# Patient Record
Sex: FEMALE | URBAN - NONMETROPOLITAN AREA
[De-identification: names, ages, dates, MRNs, and addresses within clinical notes are randomized per-mention and may not be internally consistent; named-entity substitution may affect disease eponyms.]

---

## 2024-09-20 ENCOUNTER — HOSPITAL ENCOUNTER (OUTPATIENT)
Dept: CT IMAGING | Age: 29
Discharge: HOME | End: 2024-09-20
Attending: RADIOLOGY

## 2024-09-20 DIAGNOSIS — Z00.6 EXAMINATION FOR NORMAL COMPARISON FOR CLINICAL RESEARCH: ICD-10-CM

## 2024-09-25 ENCOUNTER — OFFICE VISIT (OUTPATIENT)
Dept: NEUROLOGY | Age: 29
End: 2024-09-25
Payer: COMMERCIAL

## 2024-09-25 VITALS
OXYGEN SATURATION: 97 % | BODY MASS INDEX: 49.87 KG/M2 | HEIGHT: 62 IN | HEART RATE: 92 BPM | DIASTOLIC BLOOD PRESSURE: 68 MMHG | WEIGHT: 271 LBS | SYSTOLIC BLOOD PRESSURE: 130 MMHG

## 2024-09-25 DIAGNOSIS — H47.10 PAPILLEDEMA OF BOTH EYES: ICD-10-CM

## 2024-09-25 DIAGNOSIS — G44.89 OTHER HEADACHE SYNDROME: Primary | ICD-10-CM

## 2024-09-25 PROCEDURE — 99205 OFFICE O/P NEW HI 60 MIN: CPT | Performed by: PSYCHIATRY & NEUROLOGY

## 2024-09-25 RX ORDER — LEVOTHYROXINE SODIUM 75 UG/1
75 TABLET ORAL DAILY
COMMUNITY
Start: 2024-09-15

## 2024-09-25 RX ORDER — FLUOXETINE 20 MG/1
20 TABLET, FILM COATED ORAL DAILY
COMMUNITY
Start: 2024-09-15

## 2024-09-25 NOTE — PATIENT INSTRUCTIONS
MRI brain W/WO contrast  Obtain records from optometry, Dr. Sidhu for review  Referral to opthalmology re: dilated eye exam, visual field mapping  Lumbar puncture to be done for diagnostic purposes, please measure opening pressure  CSF to be sent for various cultures and studies   Vitamin A level  Vitamin E level  You need to work on weight loss efforts as discussed  Call with any new symptoms or concerns.   Follow up 1 month

## 2024-10-04 NOTE — PROGRESS NOTES
Chief Complaint   Patient presents with    Consultation/Headache     Poonam Yusuf is a 29 y.o. female who presents today for evaluation of headache since teenage years that have gotten worse in the past 4-5 months. Location of her pain is behind her eyes bilaterally and radiate to her occipital area. She denies any change in vision. She was seen by her eye doctor and was told  she had bilateral optic nerve swelling, and noticed she also had a blind spot in her visual field. She had CT head WO contrast done 9/12/24 at [facility] that showed no concerning findings.  She is not currently on any oral birth control. She has gained 30-40 pounds in the past 1 year unintentionally. No antibiotic use. No family history of similar symptoms. She   denies chest pain. No shortness of breath, no neck pain. No vision changes. No dysphagia. No fever. No rash. No weight loss.  History provided by patient accompanied by her family.       Past Medical History:   Diagnosis Date    Depression     Hashimoto's thyroiditis     Headache     Hyperthyroidism        There is no problem list on file for this patient.      Allergies   Allergen Reactions    Ceclor [Cefaclor]     Imitrex [Sumatriptan]     Seasonal        Current Outpatient Medications   Medication Sig Dispense Refill    FLUoxetine (PROZAC) 20 MG tablet Take 1 tablet by mouth daily      levothyroxine (SYNTHROID) 75 MCG tablet Take 1 tablet by mouth Daily       No current facility-administered medications for this visit.       Social History     Socioeconomic History    Marital status:      Spouse name: None    Number of children: None    Years of education: None    Highest education level: None   Tobacco Use    Smoking status: Former     Types: Cigarettes    Smokeless tobacco: Never   Vaping Use    Vaping status: Every Day    Substances: Nicotine   Substance and Sexual Activity    Alcohol use: Yes     Comment: social    Drug use: Never    Sexual activity: Yes     Partners: Male

## 2024-10-11 ENCOUNTER — HOSPITAL ENCOUNTER (OUTPATIENT)
Dept: MRI IMAGING | Age: 29
Discharge: HOME | End: 2024-10-11
Payer: COMMERCIAL

## 2024-10-11 DIAGNOSIS — G44.89 OTHER HEADACHE SYNDROME: ICD-10-CM

## 2024-10-11 DIAGNOSIS — H47.10 PAPILLEDEMA OF BOTH EYES: ICD-10-CM

## 2024-10-11 PROCEDURE — 6360000004 HC RX CONTRAST MEDICATION: Performed by: NURSE PRACTITIONER

## 2024-10-11 PROCEDURE — 70553 MRI BRAIN STEM W/O & W/DYE: CPT

## 2024-10-11 PROCEDURE — A9579 GAD-BASE MR CONTRAST NOS,1ML: HCPCS | Performed by: NURSE PRACTITIONER

## 2024-10-11 RX ADMIN — GADOTERIDOL 20 ML: 279.3 INJECTION, SOLUTION INTRAVENOUS at 13:01

## 2024-10-15 ENCOUNTER — TELEPHONE (OUTPATIENT)
Dept: NEUROLOGY | Age: 29
End: 2024-10-15

## 2024-10-15 DIAGNOSIS — G44.89 OTHER HEADACHE SYNDROME: ICD-10-CM

## 2024-10-15 DIAGNOSIS — H47.10 PAPILLEDEMA OF BOTH EYES: Primary | ICD-10-CM

## 2024-10-15 NOTE — TELEPHONE ENCOUNTER
Per Alejandra, patient needs to complete lumbar puncture prior to follow up appointment.  LM for patient to return call to see if she had scheduled LP and to reschedule in office appointment.

## 2024-10-17 ENCOUNTER — PATIENT MESSAGE (OUTPATIENT)
Dept: NEUROLOGY | Age: 29
End: 2024-10-17

## 2024-10-17 NOTE — TELEPHONE ENCOUNTER
Lumbar Puncture scheduled at [facility] 10/24/2024 at 9:30. Patient is to arrive at 8 am. NPO 2 hours. Hold aspirin / blood thinners x 5 days prior. Needs .     Rescheduled follow up appointment with Paige Leopold for 11/08/2024 at 8 am.     Patient advised of the above information. Verbalized understanding with no questions at this time.

## 2024-10-24 ENCOUNTER — HOSPITAL ENCOUNTER (OUTPATIENT)
Dept: INTERVENTIONAL RADIOLOGY/VASCULAR | Age: 29
Discharge: HOME | End: 2024-10-24
Payer: COMMERCIAL

## 2024-10-24 VITALS
TEMPERATURE: 97.5 F | OXYGEN SATURATION: 97 % | SYSTOLIC BLOOD PRESSURE: 118 MMHG | BODY MASS INDEX: 50.66 KG/M2 | DIASTOLIC BLOOD PRESSURE: 72 MMHG | HEART RATE: 87 BPM | WEIGHT: 277 LBS | RESPIRATION RATE: 18 BRPM

## 2024-10-24 DIAGNOSIS — H47.10 PAPILLEDEMA OF BOTH EYES: ICD-10-CM

## 2024-10-24 DIAGNOSIS — G44.89 OTHER HEADACHE SYNDROME: ICD-10-CM

## 2024-10-24 LAB
CHARACTER, CSF: ABNORMAL
CMV DNA CSF QL NAA+PROBE: NOT DETECTED
COLOR CSF: ABNORMAL
CRYPTOC AG CSF QL: NOT DETECTED
E COLI K1 AG CSF QL: NOT DETECTED
EV RNA CSF QL NAA+PROBE: NOT DETECTED
GLUCOSE CSF-MCNC: 62 MG/DL (ref 40–80)
GP B STREP DNA SPEC QL NAA+PROBE: NOT DETECTED
HAEM INFLU DNA SPEC QL NAA+PROBE: NOT DETECTED
HHV6 DNA CSF QL NAA+PROBE: NOT DETECTED
HSV1 DNA CSF QL NAA+PROBE: NOT DETECTED
HSV2 DNA CSF QL NAA+PROBE: NOT DETECTED
L MONOCYTOG DNA SPEC QL NAA+PROBE: NOT DETECTED
LYMPHOCYTES NFR CSF MANUAL: 32 % (ref 0–90)
MONOS+MACROS NFR CSF MANUAL: 2 % (ref 0–45)
N MEN DNA SPEC QL NAA+PROBE: NOT DETECTED
NEUTS SEG NFR CSF MANUAL: 66 % (ref 0–6)
NUC CELL # FLD AUTO: 24 /CUMM (ref 0–5)
PARECHOVIRUS A RNA SPEC QL NAA+PROBE: NOT DETECTED
PATHOLOGIST REVIEW: ABNORMAL
PROT CSF-MCNC: 96 MG/DL (ref 12–60)
RBC # CSF MANUAL: ABNORMAL /CUMM
S PNEUM DNA SPEC QL NAA+PROBE: NOT DETECTED
TUBE VOLUME RECEIVED CSF: 3 ML
VZV DNA CSF QL NAA+PROBE: NOT DETECTED

## 2024-10-24 PROCEDURE — 2580000003 HC RX 258: Performed by: RADIOLOGY

## 2024-10-24 PROCEDURE — 89051 BODY FLUID CELL COUNT: CPT

## 2024-10-24 PROCEDURE — 86592 SYPHILIS TEST NON-TREP QUAL: CPT

## 2024-10-24 PROCEDURE — 86618 LYME DISEASE ANTIBODY: CPT

## 2024-10-24 PROCEDURE — 82164 ANGIOTENSIN I ENZYME TEST: CPT

## 2024-10-24 PROCEDURE — 62328 DX LMBR SPI PNXR W/FLUOR/CT: CPT

## 2024-10-24 PROCEDURE — 83916 OLIGOCLONAL BANDS: CPT

## 2024-10-24 PROCEDURE — 82042 OTHER SOURCE ALBUMIN QUAN EA: CPT

## 2024-10-24 PROCEDURE — 82040 ASSAY OF SERUM ALBUMIN: CPT

## 2024-10-24 PROCEDURE — 87798 DETECT AGENT NOS DNA AMP: CPT

## 2024-10-24 PROCEDURE — 82784 ASSAY IGA/IGD/IGG/IGM EACH: CPT

## 2024-10-24 PROCEDURE — 82945 GLUCOSE OTHER FLUID: CPT

## 2024-10-24 PROCEDURE — 84157 ASSAY OF PROTEIN OTHER: CPT

## 2024-10-24 RX ORDER — SODIUM CHLORIDE 450 MG/100ML
INJECTION, SOLUTION INTRAVENOUS CONTINUOUS
Status: DISCONTINUED | OUTPATIENT
Start: 2024-10-24 | End: 2024-10-25 | Stop reason: HOSPADM

## 2024-10-24 RX ADMIN — SODIUM CHLORIDE: 4.5 INJECTION, SOLUTION INTRAVENOUS at 08:23

## 2024-10-24 ASSESSMENT — PAIN SCALES - GENERAL
PAINLEVEL_OUTOF10: 4
PAINLEVEL_OUTOF10: 3
PAINLEVEL_OUTOF10: 6

## 2024-10-24 ASSESSMENT — PAIN DESCRIPTION - LOCATION
LOCATION: HEAD

## 2024-10-24 ASSESSMENT — PAIN - FUNCTIONAL ASSESSMENT: PAIN_FUNCTIONAL_ASSESSMENT: 0-10

## 2024-10-24 NOTE — PROGRESS NOTES
0851 Pt in specials radiology for lumbar puncture. Discussed procedure with pt and pt verbalizes understanding.  0900 Dr Cordero to start procedure.  0908 Opening pressure-13.  0913 Total of 11.5 ml's blood tinged fluid withdrawn. Pt tolerated well.  0915 Pt positioned on cart for comfort. Offers no complaints.  0929 Pt transferred to [facility] [place] per cart.

## 2024-10-24 NOTE — H&P
Formulation and discussion of sedation / procedure plans, risks, benefits, side effects and alternatives with patient and/or responsible adult completed.    Electronically signed by Sathish Cordero MD on 10/24/2024 at 8:57 AM

## 2024-10-24 NOTE — DISCHARGE INSTRUCTIONS
LUMBAR PUNCTURE DISCHARGE CARE    1.  Drink extra liquids today.  2.  Minimal activity for the remainder of the day.  Go home and rest.  No driving today.  3.  You may get a headache after the Lumbar Puncture.  Lying down usually lessens the headache.  You may take Tylenol for the headache.  4.  Keep puncture site lower back clean and dry for 24 hours.  Cover area with a band- aid for 1 - 2 days.    Call your doctor if:  You have a headache lasting longer than 24 hours.  Bleeding other than a small spot on the band-aid from puncture site.    Seek care immediately by going to the nearest Emergency Room if:  You have shaking, chills or temperature over 101 deg F.  You have severe headache.  You have numbness, weakness,or tingling in your legs or change in mental alertness.    Any questions call your physician or [facility] [phone] or [phone]

## 2024-10-24 NOTE — PROGRESS NOTES
Patient admitted to room 2, vitals are stable. Patient offered rights and responsibilities.     _m__ Safety:       (Environmental)  [place] to environment  Ensure ID band is correct and in place/ allergy band as needed  Assess for fall risk  Initiate fall precautions as applicable (fall band, side rails, etc.)  Call light within reach  Bed in low position/ wheels locked    __m__ Pain:       Assess pain level and characteristics  Administer analgesics as ordered  Assess effectiveness of pain management and report to MD as needed    __m__ Knowledge Deficit:  Assess baseline knowledge  Provide teaching at level of understanding  Provide teaching via preferred learning method  Evaluate teaching effectiveness    __m__ Hemodynamic/Respiratory Status:       (Pre and Post Procedure Monitoring)  Assess/Monitor vital signs and LOC  Assess Baseline SpO2 prior to any sedation  Obtain weight/height  Assess vital signs/ LOC until patient meets discharge criteria  Monitor procedure site and notify MD of any issues

## 2024-10-24 NOTE — OP NOTE
Department of Radiology  Post Procedure Progress Note      Pre-Procedure Diagnosis:  Papilledema    Procedure Performed:  Lumbar puncture with opening pressures    Anesthesia: local     Findings: successful.     Immediate Complications:  None    Estimated Blood Loss: minimal    SEE DICTATED PROCEDURE NOTE FOR COMPLETE DETAILS.    Electronically signed by Sathish Cordero MD on 10/24/2024 at 9:17 AM

## 2024-10-25 LAB — VDRL CSF QL: NONREACTIVE

## 2024-10-26 LAB
ACE CSF-CCNC: NORMAL U/L
OLIGOCLONAL BANDS CSF: NORMAL

## 2024-10-28 DIAGNOSIS — H47.10 PAPILLEDEMA OF BOTH EYES: Primary | ICD-10-CM

## 2024-10-28 DIAGNOSIS — G44.89 OTHER HEADACHE SYNDROME: ICD-10-CM

## 2024-10-28 LAB — EBV DNA SPEC QL NAA+PROBE: NORMAL

## 2024-10-29 ENCOUNTER — TELEPHONE (OUTPATIENT)
Dept: NEUROLOGY | Age: 29
End: 2024-10-29

## 2024-10-29 NOTE — TELEPHONE ENCOUNTER
----- Message from Paige Leopold, APRN - CNP sent at 10/28/2024  2:54 PM EDT -----  Please let patient know her opening pressure from her LP=13. This does not qualify her for dx of pseudotumor cerebri.   Her total nucleated cells was elevated, as well as her protein was mildly elevated=96. This is to be expected for traumatic tap.  Would recommend formal evaluation with neuro-opthalmology at [facility].  Paige Leopold, CNP

## 2024-10-30 NOTE — TELEPHONE ENCOUNTER
Patient agreeable to neuro-ophthalmology at [facility]. Patient wanted to check and see if it would be ok to get a root canal or if she should wait due to the circumstance. Also she is has vacation planned to [place] and is just wanting to make sure that the trip is ok.

## 2024-11-05 LAB — B. BURGDORFERI VLSE1/PEPC10 ABS, CSF: NORMAL

## 2025-06-23 ENCOUNTER — HOSPITAL ENCOUNTER (OUTPATIENT)
Dept: CT IMAGING | Age: 30
Discharge: HOME | End: 2025-06-23
Attending: RADIOLOGY

## 2025-06-23 DIAGNOSIS — Z00.6 ENCOUNTER FOR EXAMINATION FOR NORMAL COMPARISON OR CONTROL IN CLINICAL RESEARCH PROGRAM: ICD-10-CM

## 2025-06-24 ENCOUNTER — HOSPITAL ENCOUNTER (OUTPATIENT)
Dept: ULTRASOUND IMAGING | Age: 30
Discharge: HOME | End: 2025-06-24
Attending: RADIOLOGY

## 2025-06-24 ENCOUNTER — HOSPITAL ENCOUNTER (OUTPATIENT)
Age: 30
Setting detail: OUTPATIENT SURGERY
Discharge: HOME | End: 2025-06-24
Attending: UROLOGY | Admitting: UROLOGY
Payer: COMMERCIAL

## 2025-06-24 ENCOUNTER — ANESTHESIA (OUTPATIENT)
Dept: OPERATING ROOM | Age: 30
End: 2025-06-24
Payer: COMMERCIAL

## 2025-06-24 ENCOUNTER — ANESTHESIA EVENT (OUTPATIENT)
Dept: OPERATING ROOM | Age: 30
End: 2025-06-24
Payer: COMMERCIAL

## 2025-06-24 VITALS
BODY MASS INDEX: 46.01 KG/M2 | HEART RATE: 84 BPM | WEIGHT: 250 LBS | RESPIRATION RATE: 16 BRPM | DIASTOLIC BLOOD PRESSURE: 86 MMHG | OXYGEN SATURATION: 98 % | HEIGHT: 62 IN | SYSTOLIC BLOOD PRESSURE: 120 MMHG | TEMPERATURE: 96.9 F

## 2025-06-24 DIAGNOSIS — Z00.6 ENCOUNTER FOR EXAMINATION FOR NORMAL COMPARISON OR CONTROL IN CLINICAL RESEARCH PROGRAM: ICD-10-CM

## 2025-06-24 DIAGNOSIS — N20.0 KIDNEY STONE: Primary | ICD-10-CM

## 2025-06-24 LAB — PREGNANCY, URINE: NEGATIVE

## 2025-06-24 PROCEDURE — 3600000003 HC SURGERY LEVEL 3 BASE: Performed by: UROLOGY

## 2025-06-24 PROCEDURE — 81025 URINE PREGNANCY TEST: CPT

## 2025-06-24 PROCEDURE — 2500000003 HC RX 250 WO HCPCS: Performed by: NURSE ANESTHETIST, CERTIFIED REGISTERED

## 2025-06-24 PROCEDURE — 3700000000 HC ANESTHESIA ATTENDED CARE: Performed by: UROLOGY

## 2025-06-24 PROCEDURE — C2617 STENT, NON-COR, TEM W/O DEL: HCPCS | Performed by: UROLOGY

## 2025-06-24 PROCEDURE — 7100000001 HC PACU RECOVERY - ADDTL 15 MIN: Performed by: UROLOGY

## 2025-06-24 PROCEDURE — 7100000011 HC PHASE II RECOVERY - ADDTL 15 MIN: Performed by: UROLOGY

## 2025-06-24 PROCEDURE — 3700000001 HC ADD 15 MINUTES (ANESTHESIA): Performed by: UROLOGY

## 2025-06-24 PROCEDURE — 2709999900 HC NON-CHARGEABLE SUPPLY: Performed by: UROLOGY

## 2025-06-24 PROCEDURE — C1769 GUIDE WIRE: HCPCS | Performed by: UROLOGY

## 2025-06-24 PROCEDURE — 7100000010 HC PHASE II RECOVERY - FIRST 15 MIN: Performed by: UROLOGY

## 2025-06-24 PROCEDURE — 2580000003 HC RX 258: Performed by: UROLOGY

## 2025-06-24 PROCEDURE — 6360000002 HC RX W HCPCS: Performed by: NURSE ANESTHETIST, CERTIFIED REGISTERED

## 2025-06-24 PROCEDURE — 2720000010 HC SURG SUPPLY STERILE: Performed by: UROLOGY

## 2025-06-24 PROCEDURE — 7100000000 HC PACU RECOVERY - FIRST 15 MIN: Performed by: UROLOGY

## 2025-06-24 PROCEDURE — C1758 CATHETER, URETERAL: HCPCS | Performed by: UROLOGY

## 2025-06-24 PROCEDURE — 3600000013 HC SURGERY LEVEL 3 ADDTL 15MIN: Performed by: UROLOGY

## 2025-06-24 DEVICE — URETERAL STENT
Type: IMPLANTABLE DEVICE | Status: FUNCTIONAL
Brand: PERCUFLEX™ PLUS

## 2025-06-24 RX ORDER — LIDOCAINE HYDROCHLORIDE 20 MG/ML
INJECTION, SOLUTION INTRAVENOUS
Status: DISCONTINUED | OUTPATIENT
Start: 2025-06-24 | End: 2025-06-24 | Stop reason: SDUPTHER

## 2025-06-24 RX ORDER — CEFAZOLIN SODIUM 1 G/3ML
INJECTION, POWDER, FOR SOLUTION INTRAMUSCULAR; INTRAVENOUS
Status: DISCONTINUED | OUTPATIENT
Start: 2025-06-24 | End: 2025-06-24 | Stop reason: SDUPTHER

## 2025-06-24 RX ORDER — SODIUM CHLORIDE 9 MG/ML
INJECTION, SOLUTION INTRAVENOUS CONTINUOUS
Status: DISCONTINUED | OUTPATIENT
Start: 2025-06-24 | End: 2025-06-24 | Stop reason: HOSPADM

## 2025-06-24 RX ORDER — UBROGEPANT 100 MG/1
100 TABLET ORAL PRN
COMMUNITY
Start: 2025-03-11 | End: 2025-07-09

## 2025-06-24 RX ORDER — ONDANSETRON 2 MG/ML
INJECTION INTRAMUSCULAR; INTRAVENOUS
Status: DISCONTINUED | OUTPATIENT
Start: 2025-06-24 | End: 2025-06-24 | Stop reason: SDUPTHER

## 2025-06-24 RX ORDER — ROCURONIUM BROMIDE 10 MG/ML
INJECTION, SOLUTION INTRAVENOUS
Status: DISCONTINUED | OUTPATIENT
Start: 2025-06-24 | End: 2025-06-24 | Stop reason: SDUPTHER

## 2025-06-24 RX ORDER — DEXAMETHASONE SODIUM PHOSPHATE 4 MG/ML
INJECTION, SOLUTION INTRA-ARTICULAR; INTRALESIONAL; INTRAMUSCULAR; INTRAVENOUS; SOFT TISSUE
Status: DISCONTINUED | OUTPATIENT
Start: 2025-06-24 | End: 2025-06-24 | Stop reason: SDUPTHER

## 2025-06-24 RX ORDER — PROPOFOL 10 MG/ML
INJECTION, EMULSION INTRAVENOUS
Status: DISCONTINUED | OUTPATIENT
Start: 2025-06-24 | End: 2025-06-24 | Stop reason: SDUPTHER

## 2025-06-24 RX ORDER — FENTANYL CITRATE 50 UG/ML
INJECTION, SOLUTION INTRAMUSCULAR; INTRAVENOUS
Status: DISCONTINUED | OUTPATIENT
Start: 2025-06-24 | End: 2025-06-24 | Stop reason: SDUPTHER

## 2025-06-24 RX ORDER — OXYCODONE AND ACETAMINOPHEN 5; 325 MG/1; MG/1
1 TABLET ORAL EVERY 4 HOURS PRN
Qty: 28 TABLET | Refills: 0 | Status: SHIPPED | OUTPATIENT
Start: 2025-06-24 | End: 2025-07-01

## 2025-06-24 RX ORDER — CIPROFLOXACIN 500 MG/1
500 TABLET, FILM COATED ORAL 2 TIMES DAILY
Qty: 6 TABLET | Refills: 0 | Status: SHIPPED | OUTPATIENT
Start: 2025-06-24 | End: 2025-06-27

## 2025-06-24 RX ORDER — TAMSULOSIN HYDROCHLORIDE 0.4 MG/1
0.4 CAPSULE ORAL DAILY
COMMUNITY
Start: 2025-06-17

## 2025-06-24 RX ORDER — HYDROCODONE BITARTRATE AND ACETAMINOPHEN 5; 325 MG/1; MG/1
1 TABLET ORAL EVERY 6 HOURS PRN
COMMUNITY

## 2025-06-24 RX ORDER — TOPIRAMATE 100 MG/1
100 TABLET, FILM COATED ORAL 2 TIMES DAILY
COMMUNITY
Start: 2025-05-09 | End: 2025-11-05

## 2025-06-24 RX ORDER — PHENAZOPYRIDINE HYDROCHLORIDE 200 MG/1
200 TABLET, FILM COATED ORAL 3 TIMES DAILY PRN
Qty: 9 TABLET | Refills: 0 | Status: SHIPPED | OUTPATIENT
Start: 2025-06-24

## 2025-06-24 RX ADMIN — ROCURONIUM BROMIDE 50 MG: 10 INJECTION, SOLUTION INTRAVENOUS at 14:29

## 2025-06-24 RX ADMIN — FENTANYL CITRATE 50 MCG: 50 INJECTION, SOLUTION INTRAMUSCULAR; INTRAVENOUS at 14:29

## 2025-06-24 RX ADMIN — CEFAZOLIN 3 G: 1 INJECTION, POWDER, FOR SOLUTION INTRAMUSCULAR; INTRAVENOUS at 14:42

## 2025-06-24 RX ADMIN — Medication 100 MG: at 14:29

## 2025-06-24 RX ADMIN — DEXAMETHASONE SODIUM PHOSPHATE 10 MG: 4 INJECTION, SOLUTION INTRAMUSCULAR; INTRAVENOUS at 14:30

## 2025-06-24 RX ADMIN — SODIUM CHLORIDE: 0.9 INJECTION, SOLUTION INTRAVENOUS at 13:28

## 2025-06-24 RX ADMIN — PROPOFOL 200 MG: 10 INJECTION, EMULSION INTRAVENOUS at 14:29

## 2025-06-24 RX ADMIN — ONDANSETRON 4 MG: 2 INJECTION, SOLUTION INTRAMUSCULAR; INTRAVENOUS at 14:49

## 2025-06-24 RX ADMIN — FENTANYL CITRATE 50 MCG: 50 INJECTION, SOLUTION INTRAMUSCULAR; INTRAVENOUS at 14:33

## 2025-06-24 ASSESSMENT — PAIN DESCRIPTION - DESCRIPTORS: DESCRIPTORS: ACHING

## 2025-06-24 ASSESSMENT — PAIN - FUNCTIONAL ASSESSMENT
PAIN_FUNCTIONAL_ASSESSMENT: PREVENTS OR INTERFERES SOME ACTIVE ACTIVITIES AND ADLS
PAIN_FUNCTIONAL_ASSESSMENT: 0-10

## 2025-06-24 NOTE — ANESTHESIA PRE PROCEDURE
Department of Anesthesiology  Preprocedure Note       Name:  Poonam Yusuf   Age:  30 y.o.  :  1995                                          MRN:  [id number]         Date:  2025      Surgeon: Surgeon(s):  Alberto Jaramillo MD    Procedure: Procedure(s):  CYSTO, RIGHT URETEROSCOPY, HOLMIUM  LASER, LITHOTRIPSY, BASKET RETRIEVAL OF STONE FRAGMENTS, URETERAL STENT PLACEMENT    Medications prior to admission:   Prior to Admission medications    Medication Sig Start Date End Date Taking? Authorizing Provider   HYDROcodone-acetaminophen (NORCO) 5-325 MG per tablet Take 1 tablet by mouth every 6 hours as needed for Pain. Max Daily Amount: 4 tablets   Yes Conner Roman MD   tamsulosin (FLOMAX) 0.4 MG capsule Take 1 capsule by mouth daily 25  Yes Conner Roman MD   topiramate (TOPAMAX) 100 MG tablet Take 1 tablet by mouth 2 times daily 25 Yes Conner Roman MD   UBRELVY 100 MG TABS Take 100 mg by mouth as needed 3/11/25 7/9/25 Yes Conner Roman MD   FLUoxetine (PROZAC) 20 MG capsule Take 1 capsule by mouth daily   Yes Conner Roman MD   levothyroxine (SYNTHROID) 100 MCG tablet Take 1 tablet by mouth Daily   Yes Conner Roman MD   Aspirin-Acetaminophen-Caffeine (EXCEDRIN PO) Take 250 mg by mouth 2 times daily   Yes Conner Roman MD   Fexofenadine HCl (ALLEGRA PO) Take by mouth daily   Yes Conner Roman MD       Current medications:    Current Facility-Administered Medications   Medication Dose Route Frequency Provider Last Rate Last Admin    0.9 % sodium chloride infusion   IntraVENous Continuous Alberto Jaramillo  mL/hr at 25 1328 New Bag at 25 1328       Allergies:    Allergies   Allergen Reactions    Ceclor [Cefaclor]     Environmental/Seasonal     Imitrex [Sumatriptan]        Problem List:  There is no problem list on file for this patient.      Past Medical History:        Diagnosis Date    Depression     Hashimoto's

## 2025-06-24 NOTE — PROGRESS NOTES
1456- pt to pacu. Drowsy, cooperative with assessment. VSS. Pt denies pain,nausea. Pt repots dry throat, will start ice chips once pt more awake. VSS    1507- pt resting in bed eyes closed. Continues to deny complaint. VSS    1514- pt continues to deny complaint when woke, drifts back to sleep quickly. VSS    1526- pt assisted on bedpan per request. Continues to deny complaint other than migraine, pt reports she didn't bring any migraine medication with her, she will take her migraine medicine when she gets home.     1533- pt resting in bed eyes closed. VSS. Pt removed from bedpan, minimal bloody urine noted. Pt meets criteria for discharge from pacu    1541- pt returned to sds instable condition, MIL in room upon return. Report given to Lonny

## 2025-06-24 NOTE — H&P
History and Physical    Patient:  Poonam Yusuf  MRN: [id number]  YOB: 1995    CHIEF COMPLAINT:  Right kidney stone [N20.0]      HISTORY OF PRESENT ILLNESS:   The patient is a 30 y.o. female who presents with as above, here for surgery    Patient's old records, notes and chart reviewed and summarized above.     Past Medical History:    Past Medical History:   Diagnosis Date    Depression     Hashimoto's thyroiditis     Headache     Hyperthyroidism     Hypothyroidism        Past Surgical History:    Past Surgical History:   Procedure Laterality Date    WISDOM TOOTH EXTRACTION Bilateral     2010     Medications Prior to Admission:    Prior to Admission medications    Medication Sig Start Date End Date Taking? Authorizing Provider   HYDROcodone-acetaminophen (NORCO) 5-325 MG per tablet Take 1 tablet by mouth every 6 hours as needed for Pain. Max Daily Amount: 4 tablets   Yes Conner Roman MD   tamsulosin (FLOMAX) 0.4 MG capsule Take 1 capsule by mouth daily 6/17/25  Yes Conner Roman MD   topiramate (TOPAMAX) 100 MG tablet Take 1 tablet by mouth 2 times daily 5/9/25 11/5/25 Yes Conner Roman MD   UBRELVY 100 MG TABS Take 100 mg by mouth as needed 3/11/25 7/9/25 Yes Conner Roman MD   FLUoxetine (PROZAC) 20 MG capsule Take 1 capsule by mouth daily   Yes Conner Roman MD   levothyroxine (SYNTHROID) 100 MCG tablet Take 1 tablet by mouth Daily   Yes Conner Roman MD   Aspirin-Acetaminophen-Caffeine (EXCEDRIN PO) Take 250 mg by mouth 2 times daily   Yes Conner Roman MD   Fexofenadine HCl (ALLEGRA PO) Take by mouth daily   Yes Conner Roman MD       Allergies:  Ceclor [cefaclor], Environmental/seasonal, and Imitrex [sumatriptan]    Social History:    Social History     Socioeconomic History    Marital status:      Spouse name: Not on file    Number of children: Not on file    Years of education: Not on file    Highest education level:

## 2025-06-24 NOTE — PROGRESS NOTES
Patient oriented to Same Day department and admitted to Same Day Surgery room 2.   Patient verbalized approval for first name, last initial with physician name on unit whiteboard.     Plan of care reviewed with patient.   Patient room whiteboard filled out and discussed with patient and responsible adult.   Patient and responsible adult offered Same Day Welcome Packet to review.    Call light in reach.   Bed in lowest position, locked, with one bed rail up.   SCDs and warming blanket in place.  Appropriate arm bands on patient.   Bathroom offered.   All questions and concerns of patient addressed.        Meds to Beds:   Patient informed of [facility] program during admission. Patient is agreeable to program.   Contact information for the pharmacy and the Meds to Beds program:   Name: Poonam   Relationship to patient:patient   Phone number: [phone]

## 2025-06-24 NOTE — ANESTHESIA POSTPROCEDURE EVALUATION
Department of Anesthesiology  Postprocedure Note    Patient: Poonam Yusuf  MRN: [id number]  YOB: 1995  Date of evaluation: 6/24/2025    Procedure Summary       Date: 06/24/25 [facility] / [facility]    Anesthesia Start: 1427 Anesthesia Stop: 1500    Procedure: CYSTO, RIGHT URETEROSCOPY, HOLMIUM  LASER, LITHOTRIPSY, URETERAL STENT PLACEMENT (Right) Diagnosis:       Right kidney stone      (Right kidney stone [N20.0])    Surgeons: Alberto Jaramillo MD Responsible Provider: Supa Saleh DO    Anesthesia Type: general ASA Status: 3            Anesthesia Type: No value filed.    Abdi Phase I: Abdi Score: 9    Abdi Phase II:      Anesthesia Post Evaluation    Patient location during evaluation: PACU  Patient participation: complete - patient participated  Level of consciousness: awake and alert  Pain score: 3  Airway patency: patent  Nausea & Vomiting: no nausea and no vomiting  Cardiovascular status: hemodynamically stable and blood pressure returned to baseline  Respiratory status: spontaneous ventilation, room air and acceptable  Hydration status: stable  Pain management: adequate and satisfactory to patient    No notable events documented.

## 2025-06-24 NOTE — OP NOTE
Operative Note      Patient: Poonam Yusuf  YOB: 1995  MRN: [id number]    Date of Procedure: 6/24/2025    Pre-Op Diagnosis Codes:      * Right kidney stone [N20.0]    Post-Op Diagnosis: {MH OR SAME:826505160}       Procedure(s):  CYSTO, RIGHT URETEROSCOPY, HOLMIUM  LASER, LITHOTRIPSY, URETERAL STENT PLACEMENT    Surgeon(s):  Alberto Dawkins MD    Assistant:   * No surgical staff found *    Anesthesia: General    Estimated Blood Loss (mL): {NUMBERS; EBL:23301}    Complications: {Symptoms; Intra-op complications:04654}    Specimens:   * No specimens in log *    Implants:  Implant Name Type Inv. Item Serial No.  Lot No. LRB No. Used Action   STENT URET 6FR L24CM HYDR+ GRAD CIRCUMFERENTIAL MRK LO PROF - [id number]  STENT URET 6FR L24CM HYDR+ GRAD CIRCUMFERENTIAL MRK LO PROF  [facility]- [id number] Right 1 Implanted         Drains:   Ureteral Drain/Stent 06/24/25 Right Ureter (Active)       Findings:  Infection Present At Time Of Surgery (PATOS) (choose all levels that have infection present):  {PATOS LEVELS:029407515}  Other Findings: ***    Detailed Description of Procedure:   ***    Electronically signed by ALBERTO DAWKINS MD on 6/24/2025 at 4:08 PM

## 2025-06-24 NOTE — PROGRESS NOTES
Pt returned to [facility] room 2. Vitals and assessment as charted. 0.9 infusing, @400ml to count from PACU. Pt has muffin and pop. Family at the bedside. Pt and family verbalized understanding of discharge criteria and call light use. Call light in reach.

## 2025-06-24 NOTE — PROGRESS NOTES
Pt has met discharge criteria and states she is ready for discharge to home. IV removed, gauze and tape applied. Dressed in own clothes and personal belongings gathered. Discharge instructions (with opioid medication education information) given to pt and family; pt and family verbalized understanding of discharge instructions, prescriptions and follow up appointments. Pt walked out with family.

## 2025-06-25 ENCOUNTER — TELEPHONE (OUTPATIENT)
Dept: UROLOGY | Age: 30
End: 2025-06-25

## 2025-06-26 NOTE — OP NOTE
Operative Note      Patient: Poonam Yusuf  YOB: 1995  MRN: [id number]    Date of Procedure: 6/24/2025    Pre-Op Diagnosis Codes:      * Right ureteral stones    Post-Op Diagnosis: Same       Procedure(s):  CYSTO, RIGHT URETEROSCOPY, HOLMIUM  LASER LITHOTRIPSY, RIGHT URETERAL STENT PLACEMENT    Surgeon(s):  Alberto Jaramillo MD    Assistant:   * No surgical staff found *    Anesthesia: General    Estimated Blood Loss (mL): Minimal    Complications: None    Specimens:   * No specimens in log *    Implants:  Implant Name Type Inv. Item Serial No.  Lot No. LRB No. Used Action   STENT URET 6FR L24CM HYDR+ GRAD CIRCUMFERENTIAL MRK LO PROF - [id number]  STENT URET 6FR L24CM HYDR+ GRAD CIRCUMFERENTIAL MRK LO PROF  [employer] [facility]OLOG[facility]- [id number] Right 1 Implanted         Drains:   [REMOVED] Ureteral Drain/Stent 06/24/25 Right Ureter (Removed)       Findings:  Infection Present At Time Of Surgery (PATOS) (choose all levels that have infection present):  No infection present  Other Findings: RIGHT URETERAL STONES    Detailed Description of Procedure:      After the risks, benefits, alternatives, of the procedure were discussed with the patient, informed consent was obtained.  The patient elected to proceed.     DETAILS OF THE PROCEDURE:  The patient was brought back from the preoperative holding area to the operating suite, and was transferred to the operating table where the patient lay in supine position. EPC's were in place, connected to the machine and the machine was turned on before induction.  General endotracheal anesthesia was induced, and patient was prepped and draped in standard surgical fashion after being placed in dorsolithotomy position. A proper timeout was performed, preoperative antibiotics were given. We began by inserting a cystoscope with a 22 Polish sheath and 30 degree lens into the patient's urethral meatus and advancing into the bladder without complication.  A pan cystoscopy

## (undated) DEVICE — ADAPTER URO SCP UROLOK LL

## (undated) DEVICE — SOLUTION IRRIG 3000ML 0.9% SOD CHL USP UROMATIC PLAS CONT

## (undated) DEVICE — CYSTO: Brand: MEDLINE INDUSTRIES, INC.

## (undated) DEVICE — DUAL LUMEN URETERAL CATHETER

## (undated) DEVICE — GUIDEWIRE URO L150CM DIA .035IN STIFF NIT HYDRPHL STR TIP

## (undated) DEVICE — SOLUTION IRRIG 1000ML STRL H2O USP PLAS POUR BTL

## (undated) DEVICE — SINGLE ACTION PUMPING SYSTEM

## (undated) DEVICE — FLEXIVA  PULSE  AND  FLEXIVA  PULSE  TRACTIP  LASER  FIBERS  ARE  HIGH  POWER  SINGLE-USE FIBER: Brand: FLEXIVA PULSE